# Patient Record
Sex: FEMALE | Race: BLACK OR AFRICAN AMERICAN | Employment: FULL TIME | URBAN - METROPOLITAN AREA
[De-identification: names, ages, dates, MRNs, and addresses within clinical notes are randomized per-mention and may not be internally consistent; named-entity substitution may affect disease eponyms.]

---

## 2018-12-29 ENCOUNTER — HOSPITAL ENCOUNTER (OUTPATIENT)
Age: 33
Discharge: HOME | End: 2018-12-29
Attending: EMERGENCY MEDICINE
Payer: COMMERCIAL

## 2018-12-29 VITALS
TEMPERATURE: 98 F | SYSTOLIC BLOOD PRESSURE: 117 MMHG | BODY MASS INDEX: 27.83 KG/M2 | OXYGEN SATURATION: 99 % | RESPIRATION RATE: 18 BRPM | DIASTOLIC BLOOD PRESSURE: 65 MMHG | HEIGHT: 64 IN | WEIGHT: 163 LBS | HEART RATE: 89 BPM

## 2018-12-29 DIAGNOSIS — J20.9 ACUTE BRONCHITIS, UNSPECIFIED ORGANISM: Primary | ICD-10-CM

## 2018-12-29 PROCEDURE — 99204 OFFICE O/P NEW MOD 45 MIN: CPT

## 2018-12-29 PROCEDURE — 99203 OFFICE O/P NEW LOW 30 MIN: CPT

## 2018-12-29 RX ORDER — PREDNISONE 20 MG/1
40 TABLET ORAL DAILY
Qty: 6 TABLET | Refills: 0 | Status: SHIPPED | OUTPATIENT
Start: 2018-12-29 | End: 2019-01-01

## 2018-12-29 NOTE — ED NOTES
Pt discharged home alert/oriented good to home. Reviewed meds and avs. Follow up as indicated. Pt verbalized understanding and agreed.

## 2018-12-29 NOTE — ED PROVIDER NOTES
Patient Seen in: [address]    History   Patient presents with:  Cough/URI: over 1 week    Stated Complaint: Bad cough and chest pains    HPI    The patient is a 40-year-old female non-smoker without significant past med discharge, no erythema or edema of the mucosa  Sinuses: Diffusely nontender  Pharynx: No erythema or exudate, uvula midline, no drooling trismus or stridor  Neck: Supple without palpable adenopathy or masses  CV: Regular rate and rhythm no murmur, no nguyen

## 2018-12-29 NOTE — ED INITIAL ASSESSMENT (HPI)
Pt presents to clinic c/o productive cough for over a week. She reports her chest feels sore from coughing. She reports taking otc tylenol pm and cough drops. Denied fever, vomiting, sore throat, and ear pain.

## 2019-06-07 ENCOUNTER — LAB ENCOUNTER (OUTPATIENT)
Dept: LAB | Facility: REFERENCE LAB | Age: 34
End: 2019-06-07
Attending: FAMILY MEDICINE
Payer: COMMERCIAL

## 2019-06-07 ENCOUNTER — OFFICE VISIT (OUTPATIENT)
Dept: FAMILY MEDICINE CLINIC | Facility: CLINIC | Age: 34
End: 2019-06-07
Payer: COMMERCIAL

## 2019-06-07 VITALS
HEART RATE: 80 BPM | DIASTOLIC BLOOD PRESSURE: 60 MMHG | BODY MASS INDEX: 27.99 KG/M2 | WEIGHT: 168 LBS | HEIGHT: 65 IN | OXYGEN SATURATION: 98 % | SYSTOLIC BLOOD PRESSURE: 118 MMHG

## 2019-06-07 DIAGNOSIS — Z91.013 ALLERGY TO FISH: ICD-10-CM

## 2019-06-07 DIAGNOSIS — Z00.00 ENCOUNTER FOR ROUTINE ADULT HEALTH EXAMINATION WITHOUT ABNORMAL FINDINGS: Primary | ICD-10-CM

## 2019-06-07 DIAGNOSIS — Z00.00 ENCOUNTER FOR ROUTINE ADULT HEALTH EXAMINATION WITHOUT ABNORMAL FINDINGS: ICD-10-CM

## 2019-06-07 PROCEDURE — 85025 COMPLETE CBC W/AUTO DIFF WBC: CPT | Performed by: FAMILY MEDICINE

## 2019-06-07 PROCEDURE — 83036 HEMOGLOBIN GLYCOSYLATED A1C: CPT | Performed by: FAMILY MEDICINE

## 2019-06-07 PROCEDURE — 36415 COLL VENOUS BLD VENIPUNCTURE: CPT | Performed by: FAMILY MEDICINE

## 2019-06-07 PROCEDURE — 80061 LIPID PANEL: CPT | Performed by: FAMILY MEDICINE

## 2019-06-07 PROCEDURE — 99385 PREV VISIT NEW AGE 18-39: CPT | Performed by: FAMILY MEDICINE

## 2019-06-07 PROCEDURE — 80053 COMPREHEN METABOLIC PANEL: CPT | Performed by: FAMILY MEDICINE

## 2019-06-07 RX ORDER — FLUTICASONE PROPIONATE 50 MCG
2 SPRAY, SUSPENSION (ML) NASAL DAILY
COMMUNITY

## 2019-06-07 NOTE — PATIENT INSTRUCTIONS
Prevention Guidelines, Women Ages 25 to 44  Screening tests and vaccines are an important part of managing your health. A screening test is done to find possible disorders or diseases in people who don't have any symptoms.  The goal is to find a disease e Type 2 diabetes, prediabetes All women diagnosed with gestational diabetes Lifelong testing every 3 years   Type 2 diabetes All women with prediabetes Every year   Gonorrhea Sexually active women at increased risk for infection At routine exams   Hepatitis Measles, mumps, rubella (MMR) All women in this age group who have no record of these infections or vaccines 1 or 2 doses   Meningococcal Women at increased risk for infection should talk with their healthcare provider 1 or more doses   Pneumococcal conjug © 9614-6737 [employer] [address] [facility], [facility] [place]. All rights reserved. This information is not intended as a substitute for professional medical care. Always follow your healthcare professional's instructions.

## 2019-06-07 NOTE — PROGRESS NOTES
HPI:   Boris Allen is a 35year old female who presents for a complete physical exam.     Last pap: 6/2018  Menses: Irregular menstrual cycles in the past but more regular recently   Contraception:  None, not sexually active    History of STD's: None : No. Children: 3son (10years old). EXAM:   Wt Readings from Last 6 Encounters:  06/07/19 : 168 lb  12/29/18 : 163 lb    Body mass index is 27.96 kg/m².    /60   Pulse 80   Ht 65\"   Wt 168 lb   LMP 04/07/2019   SpO2 98%   Breastfeedin years  -Contraception: Abstinent   -STI screening (GC/Chlamydia/HIV): Not indicated 2  -Hepatitis C screening for those born [facility] or high risk: Not indicated     All questions were answered during the visit and the patient verbalizes under

## 2019-06-17 ENCOUNTER — OFFICE VISIT (OUTPATIENT)
Dept: OBGYN CLINIC | Facility: CLINIC | Age: 34
End: 2019-06-17
Payer: COMMERCIAL

## 2019-06-17 VITALS
HEIGHT: 65 IN | WEIGHT: 166.81 LBS | BODY MASS INDEX: 27.79 KG/M2 | SYSTOLIC BLOOD PRESSURE: 110 MMHG | DIASTOLIC BLOOD PRESSURE: 70 MMHG

## 2019-06-17 DIAGNOSIS — N91.2 AMENORRHEA: ICD-10-CM

## 2019-06-17 DIAGNOSIS — Z11.3 SCREENING EXAMINATION FOR STD (SEXUALLY TRANSMITTED DISEASE): ICD-10-CM

## 2019-06-17 DIAGNOSIS — Z01.419 ENCOUNTER FOR WELL WOMAN EXAM WITH ROUTINE GYNECOLOGICAL EXAM: Primary | ICD-10-CM

## 2019-06-17 DIAGNOSIS — Z01.419 ENCOUNTER FOR GYNECOLOGICAL EXAMINATION WITHOUT ABNORMAL FINDING: ICD-10-CM

## 2019-06-17 PROCEDURE — 99385 PREV VISIT NEW AGE 18-39: CPT | Performed by: OBSTETRICS & GYNECOLOGY

## 2019-06-17 NOTE — PROGRESS NOTES
HPI:    Patient ID: Clay Farley is a 35year old female. Patient here for routine exam and pap. New to office. Desires STD testing.   Patient states she has not been sexually active for quite some time but went out this past Friday night and felt t Done.  Pap Done. Adnexa:  No adnexal masses. NT. Musculoskeletal: Normal range of motion. Lymphadenopathy:     She has no cervical adenopathy. Neurological: She is alert and oriented to person, place, and time. Skin: Skin is warm and dry.    Psych

## 2019-06-18 ENCOUNTER — LAB ENCOUNTER (OUTPATIENT)
Dept: LAB | Age: 34
End: 2019-06-18
Attending: FAMILY MEDICINE
Payer: COMMERCIAL

## 2019-06-18 DIAGNOSIS — Z11.3 SCREENING EXAMINATION FOR STD (SEXUALLY TRANSMITTED DISEASE): ICD-10-CM

## 2019-06-18 DIAGNOSIS — N91.2 AMENORRHEA: ICD-10-CM

## 2019-06-18 PROCEDURE — 87340 HEPATITIS B SURFACE AG IA: CPT

## 2019-06-18 PROCEDURE — 84702 CHORIONIC GONADOTROPIN TEST: CPT

## 2019-06-18 PROCEDURE — 86780 TREPONEMA PALLIDUM: CPT

## 2019-06-18 PROCEDURE — 36415 COLL VENOUS BLD VENIPUNCTURE: CPT

## 2019-06-18 PROCEDURE — 87389 HIV-1 AG W/HIV-1&-2 AB AG IA: CPT

## 2019-06-18 PROCEDURE — 86803 HEPATITIS C AB TEST: CPT

## 2019-06-19 ENCOUNTER — TELEPHONE (OUTPATIENT)
Dept: OBGYN CLINIC | Facility: CLINIC | Age: 34
End: 2019-06-19

## 2019-06-19 NOTE — TELEPHONE ENCOUNTER
[employer] message sent.    ----- Message from Puja Woodward MD sent at 6/18/2019  1:31 PM CDT -----  [facility] Negative. Call patient.

## 2019-06-19 NOTE — TELEPHONE ENCOUNTER
[employer] message sent to pt.    ----- Message from Johnnie Aguilar MD sent at 6/18/2019 11:56 AM CDT -----  GC/Chlamydia Culture is negative. Notify patient.

## 2019-06-19 NOTE — TELEPHONE ENCOUNTER
[employer] message sent to pt.    ----- Message from Halie Barreto MD sent at 6/19/2019 11:25 AM CDT -----  Normal pap. Notify patient.

## 2019-10-29 ENCOUNTER — PATIENT MESSAGE (OUTPATIENT)
Dept: OBGYN CLINIC | Facility: CLINIC | Age: 34
End: 2019-10-29

## 2019-10-29 RX ORDER — ETONOGESTREL AND ETHINYL ESTRADIOL 11.7; 2.7 MG/1; MG/1
1 INSERT, EXTENDED RELEASE VAGINAL
Qty: 1 EACH | Refills: 8 | Status: SHIPPED | OUTPATIENT
Start: 2019-10-29 | End: 2021-02-11

## 2019-10-31 ENCOUNTER — TELEPHONE (OUTPATIENT)
Dept: OBGYN CLINIC | Facility: CLINIC | Age: 34
End: 2019-10-31

## 2019-10-31 DIAGNOSIS — Z11.3 ROUTINE SCREENING FOR STI (SEXUALLY TRANSMITTED INFECTION): Primary | ICD-10-CM

## 2019-10-31 NOTE — TELEPHONE ENCOUNTER
Orders placed for STI testing.      ----- Message from Halie Barreto MD sent at 10/31/2019 12:56 PM CDT -----  Regarding: FW: Prescription Question  Contact: [phone]  Approved by Provider.   May order STD blood work.  ----- Message -----  From: Her

## 2019-11-05 ENCOUNTER — LAB ENCOUNTER (OUTPATIENT)
Dept: LAB | Age: 34
End: 2019-11-05
Attending: OBSTETRICS & GYNECOLOGY
Payer: COMMERCIAL

## 2019-11-05 DIAGNOSIS — Z11.3 ROUTINE SCREENING FOR STI (SEXUALLY TRANSMITTED INFECTION): ICD-10-CM

## 2019-11-05 PROCEDURE — 86780 TREPONEMA PALLIDUM: CPT

## 2019-11-05 PROCEDURE — 87340 HEPATITIS B SURFACE AG IA: CPT

## 2019-11-05 PROCEDURE — 87591 N.GONORRHOEAE DNA AMP PROB: CPT

## 2019-11-05 PROCEDURE — 87491 CHLMYD TRACH DNA AMP PROBE: CPT

## 2019-11-05 PROCEDURE — 86803 HEPATITIS C AB TEST: CPT

## 2019-11-05 PROCEDURE — 36415 COLL VENOUS BLD VENIPUNCTURE: CPT

## 2019-11-05 PROCEDURE — 87389 HIV-1 AG W/HIV-1&-2 AB AG IA: CPT

## 2019-11-06 ENCOUNTER — TELEPHONE (OUTPATIENT)
Dept: OBGYN CLINIC | Facility: CLINIC | Age: 34
End: 2019-11-06

## 2019-11-06 NOTE — TELEPHONE ENCOUNTER
[employer] message sent to pt.    ----- Message from Shaina Wolfe MD sent at 11/6/2019 12:31 PM CST -----  STD testing is negative. Notify patient.

## 2020-02-24 ENCOUNTER — TELEPHONE (OUTPATIENT)
Dept: OBGYN CLINIC | Facility: CLINIC | Age: 35
End: 2020-02-24

## 2020-02-24 RX ORDER — FLUCONAZOLE 150 MG/1
150 TABLET ORAL ONCE
Qty: 2 TABLET | Refills: 0 | Status: SHIPPED | OUTPATIENT
Start: 2020-02-24 | End: 2020-02-24

## 2020-02-24 NOTE — TELEPHONE ENCOUNTER
Pt called and informed Diflucan sent to pt's pharmacy on file. Pt voices understanding.    ----- Message from Marcos Fallon sent at 2/21/2020  3:48 PM CST -----  Regarding: Prescription Question  Contact: [phone]  Hello,     I used a new soap that

## 2020-10-30 ENCOUNTER — IMMUNIZATION (OUTPATIENT)
Dept: FAMILY MEDICINE CLINIC | Facility: CLINIC | Age: 35
End: 2020-10-30
Payer: COMMERCIAL

## 2020-10-30 DIAGNOSIS — Z23 NEED FOR VACCINATION: ICD-10-CM

## 2020-10-30 PROCEDURE — 90686 IIV4 VACC NO PRSV 0.5 ML IM: CPT | Performed by: FAMILY MEDICINE

## 2020-10-30 PROCEDURE — 90471 IMMUNIZATION ADMIN: CPT | Performed by: FAMILY MEDICINE

## 2021-02-11 ENCOUNTER — OFFICE VISIT (OUTPATIENT)
Dept: FAMILY MEDICINE CLINIC | Facility: CLINIC | Age: 36
End: 2021-02-11
Payer: COMMERCIAL

## 2021-02-11 VITALS
OXYGEN SATURATION: 100 % | SYSTOLIC BLOOD PRESSURE: 118 MMHG | WEIGHT: 174 LBS | HEIGHT: 65 IN | HEART RATE: 83 BPM | DIASTOLIC BLOOD PRESSURE: 76 MMHG | BODY MASS INDEX: 28.99 KG/M2

## 2021-02-11 DIAGNOSIS — R00.2 INTERMITTENT PALPITATIONS: ICD-10-CM

## 2021-02-11 DIAGNOSIS — M25.552 LATERAL PAIN OF LEFT HIP: ICD-10-CM

## 2021-02-11 DIAGNOSIS — Z00.01 ENCOUNTER FOR ROUTINE ADULT HEALTH EXAMINATION WITH ABNORMAL FINDINGS: Primary | ICD-10-CM

## 2021-02-11 PROBLEM — J30.9 ALLERGIC RHINITIS: Status: ACTIVE | Noted: 2021-02-11

## 2021-02-11 PROBLEM — L68.0 FEMALE HIRSUTISM: Status: ACTIVE | Noted: 2018-05-14

## 2021-02-11 PROCEDURE — 3008F BODY MASS INDEX DOCD: CPT | Performed by: FAMILY MEDICINE

## 2021-02-11 PROCEDURE — 3074F SYST BP LT 130 MM HG: CPT | Performed by: FAMILY MEDICINE

## 2021-02-11 PROCEDURE — 99395 PREV VISIT EST AGE 18-39: CPT | Performed by: FAMILY MEDICINE

## 2021-02-11 PROCEDURE — 3078F DIAST BP <80 MM HG: CPT | Performed by: FAMILY MEDICINE

## 2021-02-11 NOTE — PROGRESS NOTES
HPI:   Marina Marie is a 28year old female who presents for a complete physical exam.     Went through a period of restlessness and developed some heart palpitations that started several months ago toward the end of the summer.   Has felt palpitations b reviewed. No pertinent surgical history.    Family History   Problem Relation Age of Onset   • Heart Disorder Maternal Grandmother    • Diabetes Paternal Aunt    • Breast Cancer Paternal Aunt    • Cancer Paternal Aunt         Stomach   • Prostate Cancer Shanna Bunn complete physical exam.  Encounter for routine adult health examination with abnormal findings  (primary encounter diagnosis)  Intermittent palpitations  Lateral pain of left hip  No orders of the defined types were placed in this encounter.     Palpitation

## 2021-02-11 NOTE — PATIENT INSTRUCTIONS
Understanding Heart Palpitations    Heart palpitations are the feeling you have when your heartbeat seems to be racing, pounding, skipping, or fluttering. Heart palpitations are most often felt in the chest. Sometimes, they may also be felt in the neck. · Symptoms that don’t get better with treatment, or symptoms that get worse  · New symptoms, such as chest pain, shortness of breath, dizziness, or fainting  Jigar last reviewed this educational content on 6/1/2019 © 2000-2020 [employer] [address] Chlamydia Sexually active women ages 25 and younger, and women at increased risk for infection  Every 3 years if you're at risk or have symptoms    Depression All women in this age group  At routine exams   Diabetes mellitus, type 2  Adults with no symptom Human papillomavirus (HPV)  All women in this age group up to age 32  3 doses; the second dose should be given 1 to 2 months after the first dose and the third dose given 6 months after the first dose    Influenza (flu) All women in this age group  Once a 1 According to the ACS, women ages 21 to 44 years should have a clinical breast exam (CBE) as part of their routine health exam every 3 years. Breast self-exams are an option for women starting in their 25s.  But the U.S. Preventive Services Task Force (USP